# Patient Record
Sex: MALE | Race: WHITE | NOT HISPANIC OR LATINO | Employment: FULL TIME | URBAN - METROPOLITAN AREA
[De-identification: names, ages, dates, MRNs, and addresses within clinical notes are randomized per-mention and may not be internally consistent; named-entity substitution may affect disease eponyms.]

---

## 2023-07-24 ENCOUNTER — HOSPITAL ENCOUNTER (EMERGENCY)
Facility: HOSPITAL | Age: 23
Discharge: HOME | End: 2023-07-24
Attending: STUDENT IN AN ORGANIZED HEALTH CARE EDUCATION/TRAINING PROGRAM
Payer: COMMERCIAL

## 2023-07-24 VITALS
DIASTOLIC BLOOD PRESSURE: 66 MMHG | RESPIRATION RATE: 18 BRPM | OXYGEN SATURATION: 99 % | TEMPERATURE: 98 F | SYSTOLIC BLOOD PRESSURE: 115 MMHG | HEART RATE: 70 BPM

## 2023-07-24 DIAGNOSIS — E87.6 HYPOKALEMIA: ICD-10-CM

## 2023-07-24 DIAGNOSIS — R07.9 CHEST PAIN: ICD-10-CM

## 2023-07-24 DIAGNOSIS — R55 SYNCOPE: ICD-10-CM

## 2023-07-24 DIAGNOSIS — R55 SYNCOPE, UNSPECIFIED SYNCOPE TYPE: Primary | ICD-10-CM

## 2023-07-24 LAB
ALBUMIN SERPL-MCNC: 4.2 G/DL (ref 3.5–5)
ALBUMIN/GLOB SERPL: 1.6 RATIO (ref 1.1–2)
ALP SERPL-CCNC: 50 UNIT/L (ref 40–150)
ALT SERPL-CCNC: 9 UNIT/L (ref 0–55)
APPEARANCE UR: CLEAR
AST SERPL-CCNC: 13 UNIT/L (ref 5–34)
BACTERIA #/AREA URNS AUTO: NORMAL /HPF
BASOPHILS # BLD AUTO: 0.05 X10(3)/MCL
BASOPHILS NFR BLD AUTO: 0.7 %
BILIRUB UR QL STRIP.AUTO: NEGATIVE
BILIRUBIN DIRECT+TOT PNL SERPL-MCNC: 0.8 MG/DL
BUN SERPL-MCNC: 8.1 MG/DL (ref 8.9–20.6)
CALCIUM SERPL-MCNC: 9.5 MG/DL (ref 8.4–10.2)
CHLORIDE SERPL-SCNC: 110 MMOL/L (ref 98–107)
CK SERPL-CCNC: 76 U/L (ref 30–200)
CO2 SERPL-SCNC: 24 MMOL/L (ref 22–29)
COLOR UR: YELLOW
CREAT SERPL-MCNC: 0.75 MG/DL (ref 0.73–1.18)
EOSINOPHIL # BLD AUTO: 0.05 X10(3)/MCL (ref 0–0.9)
EOSINOPHIL NFR BLD AUTO: 0.7 %
ERYTHROCYTE [DISTWIDTH] IN BLOOD BY AUTOMATED COUNT: 13.4 % (ref 11.5–17)
GFR SERPLBLD CREATININE-BSD FMLA CKD-EPI: >60 MLS/MIN/1.73/M2
GLOBULIN SER-MCNC: 2.6 GM/DL (ref 2.4–3.5)
GLUCOSE SERPL-MCNC: 82 MG/DL (ref 74–100)
GLUCOSE UR QL STRIP.AUTO: NEGATIVE
HCT VFR BLD AUTO: 38.6 % (ref 42–52)
HGB BLD-MCNC: 13.5 G/DL (ref 14–18)
IMM GRANULOCYTES # BLD AUTO: 0.02 X10(3)/MCL (ref 0–0.04)
IMM GRANULOCYTES NFR BLD AUTO: 0.3 %
KETONES UR QL STRIP.AUTO: NEGATIVE
LEUKOCYTE ESTERASE UR QL STRIP.AUTO: ABNORMAL
LYMPHOCYTES # BLD AUTO: 1.45 X10(3)/MCL (ref 0.6–4.6)
LYMPHOCYTES NFR BLD AUTO: 21 %
MAGNESIUM SERPL-MCNC: 1.6 MG/DL (ref 1.6–2.6)
MCH RBC QN AUTO: 29.7 PG (ref 27–31)
MCHC RBC AUTO-ENTMCNC: 35 G/DL (ref 33–36)
MCV RBC AUTO: 84.8 FL (ref 80–94)
MONOCYTES # BLD AUTO: 0.67 X10(3)/MCL (ref 0.1–1.3)
MONOCYTES NFR BLD AUTO: 9.7 %
NEUTROPHILS # BLD AUTO: 4.68 X10(3)/MCL (ref 2.1–9.2)
NEUTROPHILS NFR BLD AUTO: 67.6 %
NITRITE UR QL STRIP.AUTO: NEGATIVE
NRBC BLD AUTO-RTO: 0 %
PH UR STRIP.AUTO: 8 [PH]
PLATELET # BLD AUTO: 268 X10(3)/MCL (ref 130–400)
PMV BLD AUTO: 10.2 FL (ref 7.4–10.4)
POTASSIUM SERPL-SCNC: 3.1 MMOL/L (ref 3.5–5.1)
PROT SERPL-MCNC: 6.8 GM/DL (ref 6.4–8.3)
PROT UR QL STRIP.AUTO: NEGATIVE
RBC # BLD AUTO: 4.55 X10(6)/MCL (ref 4.7–6.1)
RBC #/AREA URNS AUTO: <5 /HPF
RBC UR QL AUTO: NEGATIVE
SODIUM SERPL-SCNC: 141 MMOL/L (ref 136–145)
SP GR UR STRIP.AUTO: 1.01 (ref 1–1.03)
SQUAMOUS #/AREA URNS AUTO: <5 /HPF
TROPONIN I SERPL-MCNC: <0.01 NG/ML (ref 0–0.04)
TROPONIN I SERPL-MCNC: <0.01 NG/ML (ref 0–0.04)
UROBILINOGEN UR STRIP-ACNC: 0.2
WBC # SPEC AUTO: 6.92 X10(3)/MCL (ref 4.5–11.5)
WBC #/AREA URNS AUTO: <5 /HPF

## 2023-07-24 PROCEDURE — 81001 URINALYSIS AUTO W/SCOPE: CPT | Performed by: NURSE PRACTITIONER

## 2023-07-24 PROCEDURE — 84484 ASSAY OF TROPONIN QUANT: CPT | Performed by: NURSE PRACTITIONER

## 2023-07-24 PROCEDURE — 63600175 PHARM REV CODE 636 W HCPCS: Performed by: NURSE PRACTITIONER

## 2023-07-24 PROCEDURE — 96361 HYDRATE IV INFUSION ADD-ON: CPT

## 2023-07-24 PROCEDURE — 99285 EMERGENCY DEPT VISIT HI MDM: CPT | Mod: 25

## 2023-07-24 PROCEDURE — 93005 ELECTROCARDIOGRAM TRACING: CPT

## 2023-07-24 PROCEDURE — 93010 ELECTROCARDIOGRAM REPORT: CPT | Mod: ,,, | Performed by: STUDENT IN AN ORGANIZED HEALTH CARE EDUCATION/TRAINING PROGRAM

## 2023-07-24 PROCEDURE — 85025 COMPLETE CBC W/AUTO DIFF WBC: CPT | Performed by: NURSE PRACTITIONER

## 2023-07-24 PROCEDURE — 93010 EKG 12-LEAD: ICD-10-PCS | Mod: ,,, | Performed by: STUDENT IN AN ORGANIZED HEALTH CARE EDUCATION/TRAINING PROGRAM

## 2023-07-24 PROCEDURE — 82550 ASSAY OF CK (CPK): CPT | Performed by: NURSE PRACTITIONER

## 2023-07-24 PROCEDURE — 25000003 PHARM REV CODE 250: Performed by: NURSE PRACTITIONER

## 2023-07-24 PROCEDURE — 80053 COMPREHEN METABOLIC PANEL: CPT | Performed by: NURSE PRACTITIONER

## 2023-07-24 PROCEDURE — 96374 THER/PROPH/DIAG INJ IV PUSH: CPT

## 2023-07-24 PROCEDURE — 83735 ASSAY OF MAGNESIUM: CPT | Performed by: NURSE PRACTITIONER

## 2023-07-24 RX ORDER — POTASSIUM CHLORIDE 750 MG/1
20 TABLET, EXTENDED RELEASE ORAL DAILY
Qty: 10 TABLET | Refills: 0 | Status: SHIPPED | OUTPATIENT
Start: 2023-07-24 | End: 2023-07-29

## 2023-07-24 RX ORDER — KETOROLAC TROMETHAMINE 30 MG/ML
15 INJECTION, SOLUTION INTRAMUSCULAR; INTRAVENOUS
Status: COMPLETED | OUTPATIENT
Start: 2023-07-24 | End: 2023-07-24

## 2023-07-24 RX ORDER — KETOROLAC TROMETHAMINE 10 MG/1
10 TABLET, FILM COATED ORAL EVERY 6 HOURS
Qty: 20 TABLET | Refills: 0 | Status: SHIPPED | OUTPATIENT
Start: 2023-07-24 | End: 2023-07-29

## 2023-07-24 RX ADMIN — SODIUM CHLORIDE 1000 ML: 9 INJECTION, SOLUTION INTRAVENOUS at 12:07

## 2023-07-24 RX ADMIN — KETOROLAC TROMETHAMINE 15 MG: 30 INJECTION, SOLUTION INTRAMUSCULAR; INTRAVENOUS at 02:07

## 2023-07-24 NOTE — Clinical Note
Sunita Arredondo accompanied their caregiver to the emergency department on 7/24/2023. They may return to work on 07/25/2023.      If you have any questions or concerns, please don't hesitate to call.      Bryan East IV, MD

## 2023-07-24 NOTE — DISCHARGE INSTRUCTIONS
Toradol for pain/inflammation, take with food. Hydrate with lots of water including gatorade or powerade. Take potassium for 5 days to help with your slightly low potassium.     If symptoms happen again or worsen, return to ER

## 2023-07-24 NOTE — ED PROVIDER NOTES
"Encounter Date: 7/24/2023       History     Chief Complaint   Patient presents with    Loss of Consciousness     Syncopal episode today after working in the attic for 5 hours. C/o cramping "all over" that resolved prior to arrival. Received 500 mL NS in route. GCS 15     See MDM    The history is provided by the patient. No  was used.   Review of patient's allergies indicates:  No Known Allergies  No past medical history on file.  No past surgical history on file.  No family history on file.     Review of Systems   Cardiovascular:  Positive for chest pain.   Musculoskeletal:  Positive for myalgias.   Neurological:  Positive for syncope and light-headedness.   All other systems reviewed and are negative.    Physical Exam     Initial Vitals   BP Pulse Resp Temp SpO2   07/24/23 1214 07/24/23 1214 07/24/23 1214 07/24/23 1236 07/24/23 1214   108/70 75 18 98.1 °F (36.7 °C) 99 %      MAP       --                Physical Exam    Nursing note and vitals reviewed.  Constitutional: He appears well-developed and well-nourished.   Cardiovascular:  Normal rate, regular rhythm, normal heart sounds and intact distal pulses.           Pulmonary/Chest: Breath sounds normal. No respiratory distress. He exhibits tenderness (left side chest wall pain).   Abdominal: Abdomen is soft. Bowel sounds are normal. He exhibits no distension. There is no abdominal tenderness.   Musculoskeletal:         General: Normal range of motion.     Neurological: He is alert and oriented to person, place, and time.   Skin: Skin is warm and dry.   Psychiatric: He has a normal mood and affect.       ED Course   Procedures  Labs Reviewed   CBC WITH DIFFERENTIAL - Abnormal; Notable for the following components:       Result Value    RBC 4.55 (*)     Hgb 13.5 (*)     Hct 38.6 (*)     All other components within normal limits   COMPREHENSIVE METABOLIC PANEL - Abnormal; Notable for the following components:    Potassium Level 3.1 (*)     " Chloride 110 (*)     Blood Urea Nitrogen 8.1 (*)     All other components within normal limits   URINALYSIS, REFLEX TO URINE CULTURE - Abnormal; Notable for the following components:    Leukocyte Esterase, UA Trace (*)     All other components within normal limits   MAGNESIUM - Normal   CK - Normal   TROPONIN I - Normal   URINALYSIS, MICROSCOPIC - Normal   TROPONIN I - Normal     EKG Readings: (Independently Interpreted)   Initial Reading: No STEMI. Rhythm: Sinus Arrhythmia. Heart Rate: 63. Ectopy: No Ectopy. Clinical Impression: Sinus Arrhythmia   Repeat EKG at 1736 shows sinus vimal at 58 bpm with nonspecific st abnormality. No STEMI.    ECG Results              EKG 12-lead (Final result)  Result time 07/24/23 19:35:11      Final result by Interface, Lab In [facility] (07/24/23 19:35:11)                   Narrative:    Test Reason : R07.9,    Vent. Rate : 058 BPM     Atrial Rate : 058 BPM     P-R Int : 138 ms          QRS Dur : 092 ms      QT Int : 368 ms       P-R-T Axes : 045 083 076 degrees     QTc Int : 361 ms    Sinus bradycardia  Nonspecific ST abnormality  Abnormal ECG  Confirmed by Cesar Santana MD ([id number]) on 7/24/2023 7:35:03 PM    Referred By: [email or web address]   SELF           Confirmed By:Cesar Santana MD                                     EKG 12-lead (Final result)  Result time 07/24/23 19:28:25      Final result by Interface, Lab In [facility] (07/24/23 19:28:25)                   Narrative:    Test Reason : R55,    Vent. Rate : 063 BPM     Atrial Rate : 063 BPM     P-R Int : 132 ms          QRS Dur : 096 ms      QT Int : 360 ms       P-R-T Axes : 074 087 078 degrees     QTc Int : 368 ms    Sinus rhythm with marked sinus arrythmia  Incomplete right bundle branch block  Nonspecific ST abnormality  Abnormal ECG  No previous ECGs available  Confirmed by Cesar Santana MD ([id number]) on 7/24/2023 7:28:13 PM    Referred By:             Confirmed By:Cesar Santana MD                                  Imaging Results               X-Ray Chest 1 View (Final result)  Result time 07/24/23 14:57:26      Final result by Faraz Fontenot MD (07/24/23 14:57:26)                   Impression:      No acute findings.      Electronically signed by: Faraz Fontenot  Date:    07/24/2023  Time:    14:57               Narrative:    EXAMINATION:  XR CHEST 1 VIEW    CLINICAL HISTORY:  chest pain;    COMPARISON:  22 April 2015    FINDINGS:  Portable frontal view of the chest was obtained. The heart is not enlarged.  Lungs are clear.  There is no pneumothorax or significant effusion.                                       Medications   sodium chloride 0.9% bolus 1,000 mL 1,000 mL (0 mLs Intravenous Stopped 7/24/23 1302)   ketorolac injection 15 mg (15 mg Intravenous Given 7/24/23 1420)     Medical Decision Making:   Differential Diagnosis:   Includes but not limited to fabrice, rhabdomyolysis, dehydration, orthostatic   Clinical Tests:   Lab Tests: Ordered and Reviewed  The following lab test(s) were unremarkable: CBC, CMP and Urinalysis  Radiological Study: Ordered and Reviewed  Medical Tests: Ordered and Reviewed  ED Management:  22 y/o male who works for [employer] company was in attic for 4 hours and then came out of the attic and felt lightheaded and passed out. Witnessed by coworkers. Came to and had chest pain, muscle cramps. Received IV fluids on way in.     Xray neg, labs unremarkable for acute findings, ekg shows sinus rhythm with sinus arrhythmia along with incomplete bundle branch block. . He received another liter of fluids here along with toradol for chest discomfort. He is feeling improved with just mild chest pain. Repeat EKG shows sinus vimal with no STEMI and no acute findings. Reviewed with Dr. East. Repeat trop pending if neg Will discharge with him doing light duty.                             Clinical Impression:   Final diagnoses:  [R55] Syncope  [R55] Syncope, unspecified syncope type (Primary)  [E87.6] Hypokalemia  [R07.9] Chest pain         ED Disposition Condition    Discharge Stable            ED Prescriptions       Medication Sig Dispense Start Date End Date Auth. Provider    ketorolac (TORADOL) 10 mg tablet Take 1 tablet (10 mg total) by mouth every 6 (six) hours. for 5 days 20 tablet 7/24/2023 7/29/2023 BEST Flores    potassium chloride (KLOR-CON) 10 MEQ TbSR Take 2 tablets (20 mEq total) by mouth once daily. for 5 days 10 tablet 7/24/2023 7/29/2023 BEST Flores          Follow-up Information       Follow up With Specialties Details Why Contact Info    primary care provider  Call in 1 week As needed, If symptoms worsen                BEST Flores  07/26/23 [id number]

## 2023-07-24 NOTE — Clinical Note
"Regis Shahnathaly Rangel was seen and treated in our emergency department on 7/24/2023.  He may return with limitations on 07/25/2023.  Light duty until 7/27/23.      Sincerely,      BEST Flores    "